# Patient Record
Sex: MALE | Race: WHITE | NOT HISPANIC OR LATINO | URBAN - METROPOLITAN AREA
[De-identification: names, ages, dates, MRNs, and addresses within clinical notes are randomized per-mention and may not be internally consistent; named-entity substitution may affect disease eponyms.]

---

## 2019-03-20 ENCOUNTER — OCCUPATIONAL HEALTH (OUTPATIENT)
Dept: URGENT CARE | Facility: CLINIC | Age: 54
End: 2019-03-20

## 2019-03-20 PROCEDURE — 99499 UNLISTED E&M SERVICE: CPT | Mod: S$GLB,,, | Performed by: NURSE PRACTITIONER

## 2019-03-20 PROCEDURE — 71046 PR XRAY, CHEST, 2 VIEWS: ICD-10-PCS | Mod: S$GLB,,, | Performed by: NURSE PRACTITIONER

## 2019-03-20 PROCEDURE — 36415 COLL VENOUS BLD VENIPUNCTURE: CPT | Mod: S$GLB,,, | Performed by: EMERGENCY MEDICINE

## 2019-03-20 PROCEDURE — 80305 PR COLLECTION ONLY DRUG SCREEN: ICD-10-PCS | Mod: S$GLB,,, | Performed by: EMERGENCY MEDICINE

## 2019-03-20 PROCEDURE — 81005 URINALYSIS: CPT | Mod: S$GLB,,, | Performed by: EMERGENCY MEDICINE

## 2019-03-20 PROCEDURE — 92552 PURE TONE AUDIOMETRY AIR: CPT | Mod: S$GLB,,, | Performed by: NURSE PRACTITIONER

## 2019-03-20 PROCEDURE — 94010 BREATHING CAPACITY TEST: ICD-10-PCS | Mod: S$GLB,,, | Performed by: NURSE PRACTITIONER

## 2019-03-20 PROCEDURE — 36415 PR COLLECTION VENOUS BLOOD,VENIPUNCTURE: ICD-10-PCS | Mod: S$GLB,,, | Performed by: EMERGENCY MEDICINE

## 2019-03-20 PROCEDURE — 99173 PR VISUAL SCREENING TEST, BILAT: ICD-10-PCS | Mod: S$GLB,,, | Performed by: EMERGENCY MEDICINE

## 2019-03-20 PROCEDURE — 94010 BREATHING CAPACITY TEST: CPT | Mod: S$GLB,,, | Performed by: NURSE PRACTITIONER

## 2019-03-20 PROCEDURE — 81005 PR  URINALYSIS: ICD-10-PCS | Mod: S$GLB,,, | Performed by: EMERGENCY MEDICINE

## 2019-03-20 PROCEDURE — 71046 X-RAY EXAM CHEST 2 VIEWS: CPT | Mod: S$GLB,,, | Performed by: NURSE PRACTITIONER

## 2019-03-20 PROCEDURE — 80305 DRUG TEST PRSMV DIR OPT OBS: CPT | Mod: S$GLB,,, | Performed by: EMERGENCY MEDICINE

## 2019-03-20 PROCEDURE — 92552 PR PURE TONE AUDIOMETRY, AIR: ICD-10-PCS | Mod: S$GLB,,, | Performed by: NURSE PRACTITIONER

## 2019-03-20 PROCEDURE — 99499 PHYSICAL - BASIC COMPLEXITY: ICD-10-PCS | Mod: S$GLB,,, | Performed by: NURSE PRACTITIONER

## 2019-03-20 PROCEDURE — 99173 VISUAL ACUITY SCREEN: CPT | Mod: S$GLB,,, | Performed by: EMERGENCY MEDICINE

## 2023-01-11 ENCOUNTER — OCCUPATIONAL HEALTH (OUTPATIENT)
Dept: URGENT CARE | Facility: CLINIC | Age: 58
End: 2023-01-11

## 2023-01-11 DIAGNOSIS — Z00.00 ENCOUNTER FOR PHYSICAL EXAMINATION: Primary | ICD-10-CM

## 2023-01-11 LAB
COLLECTION ONLY: NORMAL
DLCO: NORMAL
FEV1/FVC: NORMAL
FEV1: NORMAL
FVC: NORMAL
TLC: NORMAL

## 2023-01-11 PROCEDURE — 99172 OCULAR FUNCTION SCREEN: CPT | Mod: S$GLB,,, | Performed by: NURSE PRACTITIONER

## 2023-01-11 PROCEDURE — 80305 DRUG TEST PRSMV DIR OPT OBS: CPT | Mod: S$GLB,,, | Performed by: NURSE PRACTITIONER

## 2023-01-11 PROCEDURE — 94010 PULMONARY FUNCTION SCREENING (OCC MED PHYSICALS): ICD-10-PCS | Mod: S$GLB,,, | Performed by: NURSE PRACTITIONER

## 2023-01-11 PROCEDURE — 80305 OOH COLLECTION ONLY DRUG SCREEN: ICD-10-PCS | Mod: S$GLB,,, | Performed by: NURSE PRACTITIONER

## 2023-01-11 PROCEDURE — 71046 XR CHEST PA AND LATERAL: ICD-10-PCS | Mod: S$GLB,,, | Performed by: RADIOLOGY

## 2023-01-11 PROCEDURE — 36415 VENIPUNCTURE: ICD-10-PCS | Mod: S$GLB,,, | Performed by: NURSE PRACTITIONER

## 2023-01-11 PROCEDURE — 99172 VISUAL SCREEN, AUTOMATED W/COLOR VISION: ICD-10-PCS | Mod: S$GLB,,, | Performed by: NURSE PRACTITIONER

## 2023-01-11 PROCEDURE — 92552 AUDIOGRAM OCC MED: ICD-10-PCS | Mod: S$GLB,,, | Performed by: NURSE PRACTITIONER

## 2023-01-11 PROCEDURE — 94010 BREATHING CAPACITY TEST: CPT | Mod: S$GLB,,, | Performed by: NURSE PRACTITIONER

## 2023-01-11 PROCEDURE — 99499 UNLISTED E&M SERVICE: CPT | Mod: S$GLB,,, | Performed by: NURSE PRACTITIONER

## 2023-01-11 PROCEDURE — 99499 PHYSICAL, BASIC COMPLEXITY: ICD-10-PCS | Mod: S$GLB,,, | Performed by: NURSE PRACTITIONER

## 2023-01-11 PROCEDURE — 92552 PURE TONE AUDIOMETRY AIR: CPT | Mod: S$GLB,,, | Performed by: NURSE PRACTITIONER

## 2023-01-11 PROCEDURE — 99080 SPECIAL REPORTS OR FORMS: CPT | Mod: S$GLB,,, | Performed by: NURSE PRACTITIONER

## 2023-01-11 PROCEDURE — 99080 OSHA QUESTIONNAIRE: ICD-10-PCS | Mod: S$GLB,,, | Performed by: NURSE PRACTITIONER

## 2023-01-11 PROCEDURE — 71046 X-RAY EXAM CHEST 2 VIEWS: CPT | Mod: S$GLB,,, | Performed by: RADIOLOGY

## 2023-01-11 PROCEDURE — 36415 COLL VENOUS BLD VENIPUNCTURE: CPT | Mod: S$GLB,,, | Performed by: NURSE PRACTITIONER

## 2023-09-11 RX ORDER — AMLODIPINE BESYLATE 10 MG/1
10 TABLET ORAL
Qty: 90 TABLET | Refills: 0 | OUTPATIENT
Start: 2023-09-11

## 2023-09-11 RX ORDER — ROSUVASTATIN CALCIUM 20 MG/1
20 TABLET, COATED ORAL
Qty: 90 TABLET | Refills: 0 | OUTPATIENT
Start: 2023-09-11

## 2025-07-29 ENCOUNTER — HOSPITAL ENCOUNTER (EMERGENCY)
Facility: HOSPITAL | Age: 60
Discharge: HOME | End: 2025-07-29
Attending: EMERGENCY MEDICINE
Payer: COMMERCIAL

## 2025-07-29 VITALS
WEIGHT: 265 LBS | DIASTOLIC BLOOD PRESSURE: 68 MMHG | SYSTOLIC BLOOD PRESSURE: 116 MMHG | TEMPERATURE: 99 F | OXYGEN SATURATION: 95 % | HEIGHT: 69 IN | RESPIRATION RATE: 20 BRPM | BODY MASS INDEX: 39.25 KG/M2 | HEART RATE: 60 BPM

## 2025-07-29 DIAGNOSIS — R07.9 CHEST PAIN, UNSPECIFIED TYPE: Primary | ICD-10-CM

## 2025-07-29 DIAGNOSIS — R20.0 NUMBNESS AND TINGLING IN LEFT ARM: ICD-10-CM

## 2025-07-29 DIAGNOSIS — R20.2 NUMBNESS AND TINGLING IN LEFT ARM: ICD-10-CM

## 2025-07-29 DIAGNOSIS — R07.9 CHEST PAIN: ICD-10-CM

## 2025-07-29 LAB
ABSOLUTE EOSINOPHIL (OHS): 0.39 K/UL
ABSOLUTE MONOCYTE (OHS): 0.69 K/UL (ref 0.3–1)
ABSOLUTE NEUTROPHIL COUNT (OHS): 6.5 K/UL (ref 1.8–7.7)
ALBUMIN SERPL BCP-MCNC: 3.8 G/DL (ref 3.5–5.2)
ALP SERPL-CCNC: 63 UNIT/L (ref 40–150)
ALT SERPL W/O P-5'-P-CCNC: 23 UNIT/L (ref 10–44)
ANION GAP (OHS): 8 MMOL/L (ref 8–16)
AST SERPL-CCNC: 19 UNIT/L (ref 11–45)
BASOPHILS # BLD AUTO: 0.07 K/UL
BASOPHILS NFR BLD AUTO: 0.7 %
BILIRUB SERPL-MCNC: 0.2 MG/DL (ref 0.1–1)
BUN SERPL-MCNC: 17 MG/DL (ref 8–23)
CALCIUM SERPL-MCNC: 9 MG/DL (ref 8.7–10.5)
CHLORIDE SERPL-SCNC: 106 MMOL/L (ref 95–110)
CO2 SERPL-SCNC: 23 MMOL/L (ref 23–29)
CREAT SERPL-MCNC: 1 MG/DL (ref 0.5–1.4)
D DIMER PPP IA.FEU-MCNC: 0.33 MG/L FEU
ERYTHROCYTE [DISTWIDTH] IN BLOOD BY AUTOMATED COUNT: 13 % (ref 11.5–14.5)
GFR SERPLBLD CREATININE-BSD FMLA CKD-EPI: >60 ML/MIN/1.73/M2
GLUCOSE SERPL-MCNC: 113 MG/DL (ref 70–110)
HCT VFR BLD AUTO: 41.4 % (ref 40–54)
HGB BLD-MCNC: 13.9 GM/DL (ref 14–18)
HOLD SPECIMEN: NORMAL
IMM GRANULOCYTES # BLD AUTO: 0.04 K/UL (ref 0–0.04)
IMM GRANULOCYTES NFR BLD AUTO: 0.4 % (ref 0–0.5)
LYMPHOCYTES # BLD AUTO: 2.74 K/UL (ref 1–4.8)
MCH RBC QN AUTO: 30.5 PG (ref 27–31)
MCHC RBC AUTO-ENTMCNC: 33.6 G/DL (ref 32–36)
MCV RBC AUTO: 91 FL (ref 82–98)
NT-PROBNP SERPL-MCNC: 75 PG/ML
NUCLEATED RBC (/100WBC) (OHS): 0 /100 WBC
OHS QRS DURATION: 90 MS
OHS QTC CALCULATION: 430 MS
PLATELET # BLD AUTO: 302 K/UL (ref 150–450)
PMV BLD AUTO: 9.4 FL (ref 9.2–12.9)
POTASSIUM SERPL-SCNC: 4.2 MMOL/L (ref 3.5–5.1)
PROT SERPL-MCNC: 6.8 GM/DL (ref 6–8.4)
RBC # BLD AUTO: 4.55 M/UL (ref 4.6–6.2)
RELATIVE EOSINOPHIL (OHS): 3.7 %
RELATIVE LYMPHOCYTE (OHS): 26.3 % (ref 18–48)
RELATIVE MONOCYTE (OHS): 6.6 % (ref 4–15)
RELATIVE NEUTROPHIL (OHS): 62.3 % (ref 38–73)
SODIUM SERPL-SCNC: 137 MMOL/L (ref 136–145)
TROPONIN I SERPL HS-MCNC: <3 NG/L
WBC # BLD AUTO: 10.43 K/UL (ref 3.9–12.7)

## 2025-07-29 PROCEDURE — 25000003 PHARM REV CODE 250: Performed by: EMERGENCY MEDICINE

## 2025-07-29 PROCEDURE — 84484 ASSAY OF TROPONIN QUANT: CPT | Performed by: EMERGENCY MEDICINE

## 2025-07-29 PROCEDURE — 93005 ELECTROCARDIOGRAM TRACING: CPT | Performed by: INTERNAL MEDICINE

## 2025-07-29 PROCEDURE — 93010 ELECTROCARDIOGRAM REPORT: CPT | Mod: ,,, | Performed by: INTERNAL MEDICINE

## 2025-07-29 PROCEDURE — 71045 X-RAY EXAM CHEST 1 VIEW: CPT | Mod: TC

## 2025-07-29 PROCEDURE — 99285 EMERGENCY DEPT VISIT HI MDM: CPT | Mod: 25

## 2025-07-29 PROCEDURE — 82247 BILIRUBIN TOTAL: CPT | Performed by: EMERGENCY MEDICINE

## 2025-07-29 PROCEDURE — 71045 X-RAY EXAM CHEST 1 VIEW: CPT | Mod: 26,,, | Performed by: RADIOLOGY

## 2025-07-29 PROCEDURE — 94760 N-INVAS EAR/PLS OXIMETRY 1: CPT

## 2025-07-29 PROCEDURE — 85025 COMPLETE CBC W/AUTO DIFF WBC: CPT | Performed by: EMERGENCY MEDICINE

## 2025-07-29 PROCEDURE — 83880 ASSAY OF NATRIURETIC PEPTIDE: CPT | Performed by: EMERGENCY MEDICINE

## 2025-07-29 PROCEDURE — 85379 FIBRIN DEGRADATION QUANT: CPT | Performed by: EMERGENCY MEDICINE

## 2025-07-29 RX ORDER — ASPIRIN 325 MG
325 TABLET ORAL
Status: COMPLETED | OUTPATIENT
Start: 2025-07-29 | End: 2025-07-29

## 2025-07-29 RX ADMIN — ASPIRIN 325 MG: 325 TABLET ORAL at 08:07

## 2025-07-29 NOTE — DISCHARGE INSTRUCTIONS
As we discussed, your labs today were normal.  X-ray did not show any obvious pneumonia or other findings in the chest.  This does not mean that you do not have some early blockages in your coronary arteries and you will need follow-up with your cardiologist about this.  Also follow-up with your primary care provider.  Tingling in your arm may be secondary to pinched nerves in your neck or shoulder.  Your primary care provider can order outpatient x-rays etc..  Return here if worse in any way.

## 2025-07-29 NOTE — ED TRIAGE NOTES
"Pt to ED for c/o chest discomfort and L arm numbness x couple of days. Pt states "feels like there is a knot in the middle of my chest."   "

## 2025-07-29 NOTE — ED PROVIDER NOTES
"Encounter Date: 7/29/2025       History     Chief Complaint   Patient presents with    Chest Pain     60-year-old male, here from home via private vehicle complaining of chest discomfort and left arm numbness intermittently for the last few days.  States he feels as if there is a "knot" in the center of his chest.  Symptoms has been intermittent and seemed to be worse when he is standing, and better when he is lying flat.  No fevers or chills.  No cough or shortness of breath, no wheezing.  Denies neck pain.  States he had an angiogram about 2 years ago and there were some blockages, but not severe enough for stenting.      Review of patient's allergies indicates:  No Known Allergies  History reviewed. No pertinent past medical history.  History reviewed. No pertinent surgical history.  No family history on file.  Social History[1]  Review of Systems   Constitutional:  Negative for chills, fatigue and fever.   HENT:  Negative for congestion, rhinorrhea and sore throat.    Eyes:  Negative for photophobia and pain.   Respiratory:  Positive for chest tightness. Negative for cough and shortness of breath.    Cardiovascular:  Positive for chest pain. Negative for palpitations.   Gastrointestinal:  Negative for abdominal pain, blood in stool, constipation, diarrhea, nausea and vomiting.   Endocrine: Negative for polydipsia and polyuria.   Genitourinary:  Negative for dysuria, flank pain, frequency and hematuria.   Musculoskeletal:  Negative for back pain, myalgias and neck pain.   Neurological:  Positive for numbness (Left arm tingling). Negative for dizziness, syncope, weakness and headaches.   Psychiatric/Behavioral:  Negative for confusion. The patient is not nervous/anxious.        Physical Exam     Initial Vitals [07/29/25 0731]   BP Pulse Resp Temp SpO2   (!) 160/88 63 20 98.5 °F (36.9 °C) 98 %      MAP       --         Physical Exam    Nursing note and vitals reviewed.  Constitutional: He appears well-developed and " well-nourished. He is not diaphoretic. No distress.   HENT:   Head: Normocephalic and atraumatic.   Nose: Nose normal. Mouth/Throat: Oropharynx is clear and moist. No oropharyngeal exudate.   Eyes: Conjunctivae and EOM are normal. Pupils are equal, round, and reactive to light. No scleral icterus.   Neck: Neck supple. No JVD present.   Normal range of motion.  Cardiovascular:  Regular rhythm, normal heart sounds and intact distal pulses.           No murmur heard.  Pulmonary/Chest: Breath sounds normal. No stridor. No respiratory distress. He has no wheezes. He has no rhonchi. He has no rales.   Abdominal: Abdomen is soft. Bowel sounds are normal. He exhibits no distension. There is no abdominal tenderness.   Musculoskeletal:         General: No tenderness or edema. Normal range of motion.      Cervical back: Normal range of motion and neck supple.     Neurological: He is alert and oriented to person, place, and time. He has normal strength. No cranial nerve deficit or sensory deficit. GCS score is 15. GCS eye subscore is 4. GCS verbal subscore is 5. GCS motor subscore is 6.   Skin: Skin is warm and dry. Capillary refill takes less than 2 seconds. No rash noted. No erythema.   Psychiatric: He has a normal mood and affect. His behavior is normal.         ED Course   Procedures  Labs Reviewed   COMPREHENSIVE METABOLIC PANEL - Abnormal       Result Value    Sodium 137      Potassium 4.2      Chloride 106      CO2 23      Glucose 113 (*)     BUN 17      Creatinine 1.0      Calcium 9.0      Protein Total 6.8      Albumin 3.8      Bilirubin Total 0.2      ALP 63      AST 19      ALT 23      Anion Gap 8      eGFR >60     CBC WITH DIFFERENTIAL - Abnormal    WBC 10.43      RBC 4.55 (*)     HGB 13.9 (*)     HCT 41.4      MCV 91      MCH 30.5      MCHC 33.6      RDW 13.0      Platelet Count 302      MPV 9.4      Nucleated RBC 0      Neut % 62.3      Lymph % 26.3      Mono % 6.6      Eos % 3.7      Basophil % 0.7      Imm Grans  % 0.4      Neut # 6.50      Lymph # 2.74      Mono # 0.69      Eos # 0.39      Baso # 0.07      Imm Grans # 0.04     TROPONIN I HIGH SENSITIVITY - Normal    Troponin High Sensitive <3     NT-PRO NATRIURETIC PEPTIDE - Normal    NT-proBNP 75      Narrative:     NOTE:  Access complete set of age - and/or gender-specific reference intervals for this test in the [facility] Collection Manual.   D DIMER, QUANTITATIVE - Normal    D-Dimer 0.33     CBC W/ AUTO DIFFERENTIAL    Narrative:     The following orders were created for panel order CBC auto differential.  Procedure                               Abnormality         Status                     ---------                               -----------         ------                     CBC with Differential[[id number]]       Abnormal            Final result                 Please view results for these tests on the individual orders.   EXTRA TUBES    Narrative:     The following orders were created for panel order EXTRA TUBES.  Procedure                               Abnormality         Status                     ---------                               -----------         ------                     Gold Top Hold[[id number]]                                   Final result                 Please view results for these tests on the individual orders.   GOLD TOP HOLD    Extra Tube Hold for add-ons.       EKG Readings: (Independently Interpreted)   EKG personally reviewed by me shows normal sinus rhythm at 89 beats per minute.  ID interval 156, .  No ST elevation or depression, no T-wave change, no arrhythmia.       Imaging Results              X-Ray Chest AP Portable (Final result)  Result time 07/29/25 08:16:15      Final result by Meghann Hernandez MD (07/29/25 08:16:15)                   Impression:      No acute cardiopulmonary abnormality appreciated radiographically.  No interval detrimental change when compared to the prior exam.      Electronically signed  by: Tiago Hernandez MD  Date:    07/29/2025  Time:    08:16               Narrative:    EXAMINATION:  XR CHEST AP PORTABLE    CLINICAL HISTORY:  Chest Pain;    TECHNIQUE:  A single portable semi-upright AP chest radiograph was acquired.    COMPARISON:  Chest x-ray-11/11/2023    FINDINGS:  The cardiomediastinal silhouette is normal in appearance.  No pulmonary vascular congestion appreciated. No airspace consolidation or pulmonary mass. No significant volume of pleural fluid or pneumothorax. The bones reveal degenerative changes of the thoracic spine.                                    X-Rays:   Independently Interpreted Readings:   Other Readings:  Chest x-ray personally reviewed by me shows clear lungs bilaterally.  Normal cardiac silhouette.  Chronic degenerative skeletal changes of the thoracic spine, otherwise normal skeletal structures.    Medications   aspirin tablet 325 mg (325 mg Oral Given 7/29/25 0830)     Medical Decision Making  Differential includes myocardial infarction, angina, pneumonia, pneumothorax, pericarditis, muscle strain, nerve impingement, PE, etc. Patient's labs are unremarkable.  Negative troponin, normal D-dimer.  Chest x-ray clear, no pneumonia.  He does have some chronic degenerative changes in the thoracic spine which may be contributory to his symptoms.  I believe that he is safe for discharge home, but he his urged to follow-up with his primary care provider in his cardiologist.  He will continue previously prescribed medications and treatments, and return here as needed or if worse in any way.    Amount and/or Complexity of Data Reviewed  Labs: ordered.  Radiology: ordered.    Risk  OTC drugs.                                          Clinical Impression:  Final diagnoses:  [R07.9] Chest pain  [R07.9] Chest pain, unspecified type (Primary)  [R20.0, R20.2] Numbness and tingling in left arm          ED Disposition Condition    Discharge Stable          ED Prescriptions    None        Follow-up Information       Follow up With Specialties Details Why Contact Info    Your primary care provider  Call today      Your cardiologist  Call today                     [1]   Social History  Tobacco Use    Smoking status: Every Day     Current packs/day: 1.00     Types: Cigarettes    Smokeless tobacco: Never   Substance Use Topics    Drug use: Yes     Types: Marijuana        Felipe Archibald MD  07/29/25 1036